# Patient Record
Sex: FEMALE | Race: WHITE | Employment: OTHER | URBAN - METROPOLITAN AREA
[De-identification: names, ages, dates, MRNs, and addresses within clinical notes are randomized per-mention and may not be internally consistent; named-entity substitution may affect disease eponyms.]

---

## 2017-01-01 ENCOUNTER — TELEPHONE (OUTPATIENT)
Dept: FAMILY MEDICINE CLINIC | Age: 82
End: 2017-01-01

## 2017-01-01 ENCOUNTER — PATIENT OUTREACH (OUTPATIENT)
Dept: FAMILY MEDICINE CLINIC | Age: 82
End: 2017-01-01

## 2017-01-01 ENCOUNTER — HOSPITAL ENCOUNTER (OUTPATIENT)
Dept: LAB | Age: 82
Discharge: HOME | End: 2017-06-09
Payer: MEDICARE

## 2017-01-01 ENCOUNTER — OFFICE VISIT (OUTPATIENT)
Dept: FAMILY MEDICINE CLINIC | Age: 82
End: 2017-01-01

## 2017-01-01 VITALS
RESPIRATION RATE: 18 BRPM | HEART RATE: 78 BPM | WEIGHT: 142.8 LBS | OXYGEN SATURATION: 95 % | HEIGHT: 64 IN | DIASTOLIC BLOOD PRESSURE: 72 MMHG | TEMPERATURE: 97.6 F | BODY MASS INDEX: 24.38 KG/M2 | SYSTOLIC BLOOD PRESSURE: 130 MMHG

## 2017-01-01 VITALS
BODY MASS INDEX: 23.76 KG/M2 | HEART RATE: 63 BPM | WEIGHT: 139.2 LBS | RESPIRATION RATE: 18 BRPM | OXYGEN SATURATION: 98 % | HEIGHT: 64 IN | TEMPERATURE: 98.1 F | SYSTOLIC BLOOD PRESSURE: 124 MMHG | DIASTOLIC BLOOD PRESSURE: 68 MMHG

## 2017-01-01 DIAGNOSIS — I77.9 BILATERAL CAROTID ARTERY DISEASE (HCC): ICD-10-CM

## 2017-01-01 DIAGNOSIS — Z87.39 HISTORY OF GOUT: Chronic | ICD-10-CM

## 2017-01-01 DIAGNOSIS — L97.509 UNCONTROLLED TYPE 2 DIABETES MELLITUS WITH FOOT ULCER, UNSPECIFIED LONG TERM INSULIN USE STATUS: Primary | ICD-10-CM

## 2017-01-01 DIAGNOSIS — L97.509 UNCONTROLLED TYPE 2 DIABETES MELLITUS WITH FOOT ULCER, UNSPECIFIED LONG TERM INSULIN USE STATUS: ICD-10-CM

## 2017-01-01 DIAGNOSIS — E11.65 UNCONTROLLED TYPE 2 DIABETES MELLITUS WITH FOOT ULCER, UNSPECIFIED LONG TERM INSULIN USE STATUS: Primary | ICD-10-CM

## 2017-01-01 DIAGNOSIS — M47.22 OSTEOARTHRITIS OF SPINE WITH RADICULOPATHY, CERVICAL REGION: Chronic | ICD-10-CM

## 2017-01-01 DIAGNOSIS — E78.5 HYPERLIPIDEMIA, UNSPECIFIED HYPERLIPIDEMIA TYPE: ICD-10-CM

## 2017-01-01 DIAGNOSIS — Z00.00 MEDICARE ANNUAL WELLNESS VISIT, SUBSEQUENT: ICD-10-CM

## 2017-01-01 DIAGNOSIS — I50.22 CHRONIC SYSTOLIC CONGESTIVE HEART FAILURE (HCC): ICD-10-CM

## 2017-01-01 DIAGNOSIS — I10 ESSENTIAL HYPERTENSION, BENIGN: ICD-10-CM

## 2017-01-01 DIAGNOSIS — E55.9 VITAMIN D DEFICIENCY: ICD-10-CM

## 2017-01-01 DIAGNOSIS — E11.618: ICD-10-CM

## 2017-01-01 DIAGNOSIS — E78.00 PURE HYPERCHOLESTEROLEMIA: ICD-10-CM

## 2017-01-01 DIAGNOSIS — Z71.89 ACP (ADVANCE CARE PLANNING): ICD-10-CM

## 2017-01-01 DIAGNOSIS — N18.30 CHRONIC RENAL FAILURE, STAGE 3 (MODERATE) (HCC): ICD-10-CM

## 2017-01-01 DIAGNOSIS — M85.80 OSTEOPENIA: ICD-10-CM

## 2017-01-01 DIAGNOSIS — R20.2 NUMBNESS AND TINGLING IN BOTH HANDS: ICD-10-CM

## 2017-01-01 DIAGNOSIS — I25.10 CORONARY ARTERY DISEASE INVOLVING NATIVE CORONARY ARTERY OF NATIVE HEART WITHOUT ANGINA PECTORIS: ICD-10-CM

## 2017-01-01 DIAGNOSIS — R20.0 NUMBNESS AND TINGLING IN BOTH HANDS: ICD-10-CM

## 2017-01-01 DIAGNOSIS — M89.9 DISORDER OF BONE: ICD-10-CM

## 2017-01-01 DIAGNOSIS — E78.5 HYPERLIPIDEMIA, UNSPECIFIED HYPERLIPIDEMIA TYPE: Primary | ICD-10-CM

## 2017-01-01 DIAGNOSIS — E11.621 UNCONTROLLED TYPE 2 DIABETES MELLITUS WITH FOOT ULCER, UNSPECIFIED LONG TERM INSULIN USE STATUS: ICD-10-CM

## 2017-01-01 DIAGNOSIS — E11.65 UNCONTROLLED TYPE 2 DIABETES MELLITUS WITH FOOT ULCER, UNSPECIFIED LONG TERM INSULIN USE STATUS: ICD-10-CM

## 2017-01-01 DIAGNOSIS — M10.00 IDIOPATHIC GOUT, UNSPECIFIED CHRONICITY, UNSPECIFIED SITE: ICD-10-CM

## 2017-01-01 DIAGNOSIS — E11.621 UNCONTROLLED TYPE 2 DIABETES MELLITUS WITH FOOT ULCER, UNSPECIFIED LONG TERM INSULIN USE STATUS: Primary | ICD-10-CM

## 2017-01-01 LAB
25(OH)D3+25(OH)D2 SERPL-MCNC: 14.4 NG/ML (ref 30–100)
25(OH)D3+25(OH)D2 SERPL-MCNC: 22.1 NG/ML (ref 30–100)
ALBUMIN SERPL-MCNC: 3.9 G/DL (ref 3.5–4.7)
ALBUMIN SERPL-MCNC: 3.9 G/DL (ref 3.5–4.8)
ALBUMIN/GLOB SERPL: 1.3 {RATIO} (ref 1.1–2.5)
ALBUMIN/GLOB SERPL: 1.3 {RATIO} (ref 1.2–2.2)
ALP SERPL-CCNC: 97 IU/L (ref 39–117)
ALP SERPL-CCNC: 98 IU/L (ref 39–117)
ALT SERPL-CCNC: 21 IU/L (ref 0–32)
ALT SERPL-CCNC: 32 IU/L (ref 0–32)
AST SERPL-CCNC: 24 IU/L (ref 0–40)
AST SERPL-CCNC: 37 IU/L (ref 0–40)
BILIRUB SERPL-MCNC: 0.3 MG/DL (ref 0–1.2)
BILIRUB SERPL-MCNC: 0.4 MG/DL (ref 0–1.2)
BUN SERPL-MCNC: 38 MG/DL (ref 8–27)
BUN SERPL-MCNC: 47 MG/DL (ref 8–27)
BUN/CREAT SERPL: 32 (ref 11–26)
BUN/CREAT SERPL: 34 (ref 12–28)
CALCIUM SERPL-MCNC: 9.5 MG/DL (ref 8.7–10.3)
CALCIUM SERPL-MCNC: 9.5 MG/DL (ref 8.7–10.3)
CHLORIDE SERPL-SCNC: 101 MMOL/L (ref 96–106)
CHLORIDE SERPL-SCNC: 101 MMOL/L (ref 96–106)
CHOLEST SERPL-MCNC: 152 MG/DL (ref 100–199)
CHOLEST SERPL-MCNC: 157 MG/DL (ref 100–199)
CO2 SERPL-SCNC: 21 MMOL/L (ref 18–29)
CO2 SERPL-SCNC: 22 MMOL/L (ref 18–29)
CREAT SERPL-MCNC: 1.18 MG/DL (ref 0.57–1)
CREAT SERPL-MCNC: 1.39 MG/DL (ref 0.57–1)
EST. AVERAGE GLUCOSE BLD GHB EST-MCNC: 123 MG/DL
GLOBULIN SER CALC-MCNC: 3 G/DL (ref 1.5–4.5)
GLOBULIN SER CALC-MCNC: 3 G/DL (ref 1.5–4.5)
GLUCOSE SERPL-MCNC: 133 MG/DL (ref 65–99)
GLUCOSE SERPL-MCNC: 143 MG/DL (ref 65–99)
HBA1C MFR BLD: 5.9 % (ref 4.8–5.6)
HDLC SERPL-MCNC: 30 MG/DL
HDLC SERPL-MCNC: 31 MG/DL
INTERPRETATION, 910389: NORMAL
INTERPRETATION, 910389: NORMAL
INTERPRETATION: NORMAL
INTERPRETATION: NORMAL
LDLC SERPL CALC-MCNC: 81 MG/DL (ref 0–99)
LDLC SERPL CALC-MCNC: 85 MG/DL (ref 0–99)
PDF IMAGE, 910387: NORMAL
PDF IMAGE, 910387: NORMAL
POTASSIUM SERPL-SCNC: 5.3 MMOL/L (ref 3.5–5.2)
POTASSIUM SERPL-SCNC: 5.4 MMOL/L (ref 3.5–5.2)
PROT SERPL-MCNC: 6.9 G/DL (ref 6–8.5)
PROT SERPL-MCNC: 6.9 G/DL (ref 6–8.5)
SODIUM SERPL-SCNC: 139 MMOL/L (ref 134–144)
SODIUM SERPL-SCNC: 142 MMOL/L (ref 134–144)
TRIGL SERPL-MCNC: 185 MG/DL (ref 0–149)
TRIGL SERPL-MCNC: 227 MG/DL (ref 0–149)
VIT B12 SERPL-MCNC: 805 PG/ML (ref 211–946)
VLDLC SERPL CALC-MCNC: 37 MG/DL (ref 5–40)
VLDLC SERPL CALC-MCNC: 45 MG/DL (ref 5–40)

## 2017-01-01 PROCEDURE — 36415 COLL VENOUS BLD VENIPUNCTURE: CPT

## 2017-01-01 PROCEDURE — 80061 LIPID PANEL: CPT

## 2017-01-01 PROCEDURE — 80053 COMPREHEN METABOLIC PANEL: CPT

## 2017-01-01 PROCEDURE — 82306 VITAMIN D 25 HYDROXY: CPT

## 2017-01-01 PROCEDURE — 82607 VITAMIN B-12: CPT

## 2017-01-01 RX ORDER — INSULIN PUMP SYRINGE, 3 ML
EACH MISCELLANEOUS
Qty: 1 KIT | Refills: 0 | Status: SHIPPED | OUTPATIENT
Start: 2017-01-01

## 2017-01-01 RX ORDER — FOLIC ACID 1 MG/1
1 TABLET ORAL DAILY
Qty: 90 TAB | Refills: 3 | Status: SHIPPED | OUTPATIENT
Start: 2017-01-01

## 2017-01-01 RX ORDER — LOSARTAN POTASSIUM 100 MG/1
TABLET ORAL
Qty: 90 TAB | Refills: 1 | Status: SHIPPED | OUTPATIENT
Start: 2017-01-01

## 2017-01-01 RX ORDER — OXYCODONE AND ACETAMINOPHEN 5; 325 MG/1; MG/1
1 TABLET ORAL
Qty: 60 TAB | Refills: 0 | Status: SHIPPED | OUTPATIENT
Start: 2017-01-01

## 2017-01-01 RX ORDER — ISOSORBIDE MONONITRATE 60 MG/1
TABLET, EXTENDED RELEASE ORAL
Qty: 90 TAB | Refills: 1 | Status: SHIPPED | OUTPATIENT
Start: 2017-01-01 | End: 2017-01-01 | Stop reason: SDUPTHER

## 2017-01-01 RX ORDER — COLCHICINE 0.6 MG/1
TABLET ORAL
Qty: 90 TAB | Refills: 3 | Status: SHIPPED | OUTPATIENT
Start: 2017-01-01

## 2017-01-01 RX ORDER — ISOSORBIDE MONONITRATE 60 MG/1
TABLET, EXTENDED RELEASE ORAL
Qty: 90 TAB | Refills: 1 | Status: SHIPPED | OUTPATIENT
Start: 2017-01-01

## 2017-01-01 RX ORDER — FUROSEMIDE 20 MG/1
20 TABLET ORAL DAILY
Qty: 90 TAB | Refills: 1 | Status: SHIPPED | OUTPATIENT
Start: 2017-01-01

## 2017-01-01 RX ORDER — METOPROLOL SUCCINATE 50 MG/1
TABLET, EXTENDED RELEASE ORAL
Qty: 90 TAB | Refills: 1 | Status: SHIPPED | OUTPATIENT
Start: 2017-01-01 | End: 2017-01-01 | Stop reason: SDUPTHER

## 2017-01-01 RX ORDER — OXYCODONE AND ACETAMINOPHEN 5; 325 MG/1; MG/1
1 TABLET ORAL
Qty: 60 TAB | Refills: 0 | Status: SHIPPED | OUTPATIENT
Start: 2017-01-01 | End: 2017-01-01 | Stop reason: SDUPTHER

## 2017-01-01 RX ORDER — GABAPENTIN 300 MG/1
300 CAPSULE ORAL DAILY
Qty: 90 CAP | Refills: 3 | Status: SHIPPED | OUTPATIENT
Start: 2017-01-01 | End: 2017-01-01 | Stop reason: SDUPTHER

## 2017-01-01 RX ORDER — AZITHROMYCIN 250 MG/1
250 TABLET, FILM COATED ORAL SEE ADMIN INSTRUCTIONS
COMMUNITY
Start: 2017-01-01 | End: 2017-01-01

## 2017-01-01 RX ORDER — FUROSEMIDE 40 MG/1
40 TABLET ORAL DAILY
Qty: 90 TAB | Refills: 1 | Status: SHIPPED | OUTPATIENT
Start: 2017-01-01 | End: 2017-01-01 | Stop reason: SDUPTHER

## 2017-01-01 RX ORDER — ACETAMINOPHEN AND CODEINE PHOSPHATE 300; 30 MG/1; MG/1
1 TABLET ORAL
COMMUNITY

## 2017-01-01 RX ORDER — OXYCODONE AND ACETAMINOPHEN 5; 325 MG/1; MG/1
1 TABLET ORAL 2 TIMES DAILY
Qty: 60 TAB | Refills: 0 | Status: SHIPPED | OUTPATIENT
Start: 2017-01-01 | End: 2017-01-01 | Stop reason: SDUPTHER

## 2017-01-01 RX ORDER — GABAPENTIN 300 MG/1
300 CAPSULE ORAL DAILY
Qty: 90 CAP | Refills: 3 | Status: SHIPPED | OUTPATIENT
Start: 2017-01-01

## 2017-01-01 RX ORDER — AMLODIPINE BESYLATE 10 MG/1
10 TABLET ORAL DAILY
Qty: 90 TAB | Refills: 1 | Status: SHIPPED | OUTPATIENT
Start: 2017-01-01

## 2017-01-01 RX ORDER — LANCETS
EACH MISCELLANEOUS
Qty: 3 PACKAGE | Refills: 3 | Status: SHIPPED | OUTPATIENT
Start: 2017-01-01

## 2017-01-01 RX ORDER — LOSARTAN POTASSIUM 100 MG/1
TABLET ORAL
Qty: 90 TAB | Refills: 1 | Status: SHIPPED | OUTPATIENT
Start: 2017-01-01 | End: 2017-01-01 | Stop reason: SDUPTHER

## 2017-01-01 RX ORDER — METOPROLOL SUCCINATE 50 MG/1
TABLET, EXTENDED RELEASE ORAL
Qty: 90 TAB | Refills: 1 | Status: SHIPPED | OUTPATIENT
Start: 2017-01-01

## 2017-01-01 RX ORDER — ATORVASTATIN CALCIUM 20 MG/1
TABLET, FILM COATED ORAL
Qty: 90 TAB | Refills: 1 | Status: SHIPPED | OUTPATIENT
Start: 2017-01-01

## 2017-01-01 RX ORDER — FUROSEMIDE 20 MG/1
20 TABLET ORAL DAILY
Qty: 90 TAB | Refills: 1 | Status: SHIPPED | OUTPATIENT
Start: 2017-01-01 | End: 2017-01-01 | Stop reason: SDUPTHER

## 2017-01-01 RX ORDER — ALBUTEROL SULFATE 90 UG/1
1 AEROSOL, METERED RESPIRATORY (INHALATION)
COMMUNITY

## 2017-02-06 NOTE — TELEPHONE ENCOUNTER
----- Message from Randa Yuen sent at 2/6/2017  2:28 PM EST -----  Regarding: Dr Griffiths Leader refill  Pt (p)  [phone], requesting rx refill for her pain medication percocet, pt will  the rx when ready.

## 2017-02-06 NOTE — LETTER
2/6/2017 2:39 PM 
 
Ms. Shira Ramos 
[address] [facility] [phone] Effective January 1, 6984 a new policy will be implemented by [address] [place] [address] for patients who are receiving controlled pain medications (i.e. hydrocodone, oxycodone, hydromorphone, etc). While these medications are intended to help individuals with their chronic pain symptoms, they have the potential for serious side effects. These side effects may include, but are not limited to respiratory distress, low blood pressure, confusion, dependency and abuse. Since these medications are regulated by the Drug Enforcement Agency [facility]) and given a rise in abuse of these medications across the nation, we are obligated to monitor the use of these medications more closely. All patients receiving chronic opiate pain medications will be required to sign a controlled substance agreement. This is an agreement between patient and provider to ensure compliance with treatment. It also includes urine drug screens, maximum dosing limits for medications and follow-up appointments every three months for prescription refills. Your prescriptions will only be filled at a office visit We understand these changes may be inconvenient or frustrating, however, they align with current clinical guidelines and are intended to improve patient care and safety. Please call now to schedule an appointment with me for your next refill. Please address any questions or concerns with your provider at your next visit. Thank you for entrusting your healthcare in our hands. [address] [place] [address] Providers Sincerely, 
 
 
Ramiro Cuevas MD

## 2017-03-10 PROBLEM — M47.22 OSTEOARTHRITIS OF SPINE WITH RADICULOPATHY, CERVICAL REGION: Chronic | Status: ACTIVE | Noted: 2017-01-01

## 2017-03-10 NOTE — MR AVS SNAPSHOT
Visit Information Date & Time Provider Department Dept. Phone Encounter #  
 3/10/2017  8:30 AM Marleni Campa  [address] [place] [phone] [id number] Follow-up Instructions Return in about 6 months (around 9/10/2017) for F/U HTN,CHOL,DM. Your Appointments 6/9/2017  8:45 AM  
ROUTINE CARE with Marleni Campa MD  
[facility]) Appt Note: 3 month med check [address] [phone]  
[phone]  
  
   
 [address] [phone] Upcoming Health Maintenance Date Due OSTEOPOROSIS SCREENING (DEXA) 3/27/2002 GLAUCOMA SCREENING Q2Y 3/12/2017 EYE EXAM RETINAL OR DILATED Q1 4/10/2017* FOOT EXAM Q1 4/19/2017 HEMOGLOBIN A1C Q6M 5/29/2017 MEDICARE YEARLY EXAM 6/28/2017 MICROALBUMIN Q1 11/29/2017 LIPID PANEL Q1 11/29/2017 DTaP/Tdap/Td series (2 - Td) 6/27/2026 *Topic was postponed. The date shown is not the original due date. Allergies as of 3/10/2017  Review Complete On: 3/10/2017 By: Marleni Campa MD  
  
 Severity Noted Reaction Type Reactions Glucophage [Metformin]  10/13/2010    Anaphylaxis Current Immunizations  Reviewed on 6/16/2013 Name Date Influenza High Dose Vaccine PF 10/26/2016, 10/25/2015 Influenza Vaccine 10/27/2016, 11/8/2014, 10/1/2013 Influenza Vaccine Split 10/16/2012 Pneumococcal Polysaccharide (PPSV-23) 8/1/2012 Pneumococcal Vaccine (Unspecified Type) 10/25/2015 Not reviewed this visit You Were Diagnosed With   
  
 Codes Comments Uncontrolled type 2 diabetes mellitus with foot ulcer, unspecified long term insulin use status ([facility]    -  Primary ICD-10-CM: E11.621, L97.509, E11.65 ICD-9-CM: 250.82, 707.15 Hyperlipidemia, unspecified hyperlipidemia type     ICD-10-CM: E78.5 ICD-9-CM: 272.4 Essential hypertension, benign     ICD-10-CM: I10 
ICD-9-CM: 401.1 Osteopenia     ICD-10-CM: M85.80 ICD-9-CM: 733.90 Coronary artery disease involving native coronary artery of native heart without angina pectoris     ICD-10-CM: I25.10 ICD-9-CM: 414.01 Osteoarthritis of spine with radiculopathy, cervical region     ICD-10-CM: M47.22 
ICD-9-CM: 721.0 Vitamin D deficiency     ICD-10-CM: E55.9 ICD-9-CM: 268.9 Disorder of bone     ICD-10-CM: M89.9 ICD-9-CM: 733.90 Chronic systolic congestive heart failure (HCC)     ICD-10-CM: I50.22 ICD-9-CM: 428.22, 428.0 Vitals BP Pulse Temp Resp Height(growth percentile) Weight(growth percentile) 124/68 (BP 1 Location: Left arm, BP Patient Position: Sitting) 63 98.1 °F (36.7 °C) (Oral) 18 5' 4\" (1.626 m) 139 lb 3.2 oz (63.1 kg) SpO2 BMI OB Status Smoking Status 98% 23.89 kg/m2 Postmenopausal Never Smoker BMI and BSA Data Body Mass Index Body Surface Area  
 23.89 kg/m 2 1.69 m 2 Preferred Pharmacy Pharmacy Name Phone [facility] [address] [place] [address], [address] [place] [address] [phone] Your Updated Medication List  
  
   
This list is accurate as of: 3/10/17  9:42 AM.  Always use your most recent med list. amLODIPine 10 mg tablet Commonly known as:  Guzman Mullet Take 1 Tab by mouth daily. aspirin 81 mg tablet Take 81 mg by mouth. atorvastatin 20 mg tablet Commonly known as:  LIPITOR  
TAKE 1 TABLET BY MOUTH AT BEDTIME  
  
 colchicine 0.6 mg tablet Commonly known as:  COLCRYS  
TAKE 1 TABLET BY MOUTH EVERY DAY  
  
 folic acid 1 mg tablet Commonly known as:  Google Take 1 Tab by mouth daily. furosemide 20 mg tablet Commonly known as:  LASIX Take 1 Tab by mouth daily. gabapentin 300 mg capsule Commonly known as:  NEURONTIN Take 1 Cap by mouth daily. glucose blood VI test strips strip Commonly known as:  FREESTYLE TEST  
 Freedom freestyle test strips test tid 250.00 Insulin Needles (Disposable) 31 gauge x 5/16\" Ndle Commonly known as:  BD INSULIN PEN NEEDLE UF SHORT To use with lantus daily 250.00 Insulin Syringes (Disposable) 1 mL Syrg  
20 Units by Does Not Apply route daily. isosorbide mononitrate ER 60 mg CR tablet Commonly known as:  IMDUR  
TAKE ONE TABLET BY MOUTH EVERY DAY Lancets Misc Test tid  
  
 losartan 100 mg tablet Commonly known as:  COZAAR  
TAKE 1 TAB BY MOUTH DAILY. metoprolol succinate 50 mg XL tablet Commonly known as:  TOPROL-XL  
TAKE 1 TAB BY MOUTH DAILY. * oxyCODONE-acetaminophen 5-325 mg per tablet Commonly known as:  PERCOCET Take 1 Tab by mouth two (2) times daily as needed. * oxyCODONE-acetaminophen 5-325 mg per tablet Commonly known as:  PERCOCET Take 1 Tab by mouth two (2) times a day. Max Daily Amount: 2 Tabs. Start taking on:  4/10/2017 * oxyCODONE-acetaminophen 5-325 mg per tablet Commonly known as:  PERCOCET Take 1 Tab by mouth two (2) times a day. Max Daily Amount: 2 Tabs. Start taking on:  5/10/2017 sAXagliptin 5 mg Tab tablet Commonly known as:  ONGLYZA TAKE 1 TABLET BY MOUTH EVERY DAY  
  
 * Notice: This list has 3 medication(s) that are the same as other medications prescribed for you. Read the directions carefully, and ask your doctor or other care provider to review them with you. Prescriptions Printed Refills  
 oxyCODONE-acetaminophen (PERCOCET) 5-325 mg per tablet 0 Sig: Take 1 Tab by mouth two (2) times daily as needed. Class: Print Route: Oral  
 oxyCODONE-acetaminophen (PERCOCET) 5-325 mg per tablet 0 Starting on: 4/10/2017 Sig: Take 1 Tab by mouth two (2) times a day. Max Daily Amount: 2 Tabs. Class: Print Route: Oral  
 oxyCODONE-acetaminophen (PERCOCET) 5-325 mg per tablet 0 Starting on: 5/10/2017 Sig: Take 1 Tab by mouth two (2) times a day. Max Daily Amount: 2 Tabs. Class: Print Route: Oral  
  
We Performed the Following HEMOGLOBIN A1C WITH EAG [27689 CPT(R)] LIPID PANEL [00055 CPT(R)] METABOLIC PANEL, COMPREHENSIVE [22323 CPT(R)] MA COLLECTION VENOUS BLOOD,VENIPUNCTURE [id number] CPT(R)] MA HANDLG&/OR CONVEY OF SPEC FOR TR OFFICE TO LAB [09933 CPT(R)] VITAMIN D, 25 HYDROXY [id number] CPT(R)] Follow-up Instructions Return in about 6 months (around 9/10/2017) for F/U HTN,CHOL,DM. To-Do List   
 03/10/2017 Imaging:  DEXA BONE DENSITY STUDY AXIAL Patient Instructions High Blood Pressure: Care Instructions Your Care Instructions If your blood pressure is usually above 140/90, you have high blood pressure, or hypertension. That means the top number is 140 or higher or the bottom number is 90 or higher, or both. Despite what a lot of people think, high blood pressure usually doesn't cause headaches or make you feel dizzy or lightheaded. It usually has no symptoms. But it does increase your risk for heart attack, stroke, and kidney or eye damage. The higher your blood pressure, the more your risk increases. Your doctor will give you a goal for your blood pressure. Your goal will be based on your health and your age. An example of a goal is to keep your blood pressure below 140/90. Lifestyle changes, such as eating healthy and being active, are always important to help lower blood pressure. You might also take medicine to reach your blood pressure goal. 
Follow-up care is a key part of your treatment and safety. Be sure to make and go to all appointments, and call your doctor if you are having problems. It's also a good idea to know your test results and keep a list of the medicines you take. How can you care for yourself at home? Medical treatment · If you stop taking your medicine, your blood pressure will go back up. You may take one or more types of medicine to lower your blood pressure. Be safe with medicines. Take your medicine exactly as prescribed. Call your doctor if you think you are having a problem with your medicine. · Talk to your doctor before you start taking aspirin every day. Aspirin can help certain people lower their risk of a heart attack or stroke. But taking aspirin isn't right for everyone, because it can cause serious bleeding. · See your doctor regularly. You may need to see the doctor more often at first or until your blood pressure comes down. · If you are taking blood pressure medicine, talk to your doctor before you take decongestants or anti-inflammatory medicine, such as ibuprofen. Some of these medicines can raise blood pressure. · Learn how to check your blood pressure at home. Lifestyle changes · Stay at a healthy weight. This is especially important if you put on weight around the waist. Losing even 10 pounds can help you lower your blood pressure. · If your doctor recommends it, get more exercise. Walking is a good choice. Bit by bit, increase the amount you walk every day. Try for at least 30 minutes on most days of the week. You also may want to swim, bike, or do other activities. · Avoid or limit alcohol. Talk to your doctor about whether you can drink any alcohol. · Try to limit how much sodium you eat to less than 2,300 milligrams (mg) a day. Your doctor may ask you to try to eat less than 1,500 mg a day. · Eat plenty of fruits (such as bananas and oranges), vegetables, legumes, whole grains, and low-fat dairy products. · Lower the amount of saturated fat in your diet. Saturated fat is found in animal products such as milk, cheese, and meat. Limiting these foods may help you lose weight and also lower your risk for heart disease. · Do not smoke. Smoking increases your risk for heart attack and stroke.  If you need help quitting, talk to your doctor about stop-smoking programs and medicines. These can increase your chances of quitting for good. When should you call for help? Call 911 anytime you think you may need emergency care. This may mean having symptoms that suggest that your blood pressure is causing a serious heart or blood vessel problem. Your blood pressure may be over 180/110. For example, call 911 if: 
· You have symptoms of a heart attack. These may include: ¨ Chest pain or pressure, or a strange feeling in the chest. 
¨ Sweating. ¨ Shortness of breath. ¨ Nausea or vomiting. ¨ Pain, pressure, or a strange feeling in the back, neck, jaw, or upper belly or in one or both shoulders or arms. ¨ Lightheadedness or sudden weakness. ¨ A fast or irregular heartbeat. · You have symptoms of a stroke. These may include: 
¨ Sudden numbness, tingling, weakness, or loss of movement in your face, arm, or leg, especially on only one side of your body. ¨ Sudden vision changes. ¨ Sudden trouble speaking. ¨ Sudden confusion or trouble understanding simple statements. ¨ Sudden problems with walking or balance. ¨ A sudden, severe headache that is different from past headaches. · You have severe back or belly pain. Do not wait until your blood pressure comes down on its own. Get help right away. Call your doctor now or seek immediate care if: 
· Your blood pressure is much higher than normal (such as 180/110 or higher), but you don't have symptoms. · You think high blood pressure is causing symptoms, such as: ¨ Severe headache. ¨ Blurry vision. Watch closely for changes in your health, and be sure to contact your doctor if: 
· Your blood pressure measures 140/90 or higher at least 2 times. That means the top number is 140 or higher or the bottom number is 90 or higher, or both. · You think you may be having side effects from your blood pressure medicine. · Your blood pressure is usually normal, but it goes above normal at least 2 times. Where can you learn more? Go to http://aurea-minh.info/. Enter W597 in the search box to learn more about \"High Blood Pressure: Care Instructions. \" Current as of: August 8, 2016 Content Version: 11.1 © 6811-6936 [employer]. Care instructions adapted under license by [employer] (which disclaims liability or warranty for this information). If you have questions about a medical condition or this instruction, always ask your healthcare professional. [place] [address] any warranty or liability for your use of this information. Please provide this summary of care documentation to your next provider. Your primary care clinician is listed as [place] [address], [facility]/s . If you have any questions after today's visit, please call [phone].

## 2017-03-10 NOTE — TELEPHONE ENCOUNTER
Compa Bullock  [phone]    Ms. Christiansenpard Collet states that she was just here to see Dr. Caty Massey and he asked her if she needs a free style blood machine. She wants to let him know that she does. Please advise.

## 2017-03-10 NOTE — PROGRESS NOTES
Chief Complaint   Patient presents with    Diabetes    Hypertension    Cholesterol Problem     fasting today     1. Have you been to the ER, urgent care clinic since your last visit? Hospitalized since your last visit? No    2. Have you seen or consulted any other health care providers outside of the [address] [place] [address] since your last visit? Include any pap smears or colon screening.  No

## 2017-03-10 NOTE — TELEPHONE ENCOUNTER
Insurance covers one touch ultra machine and strips   Test bid  Freestyle discontinued per verbal order Dr Lorri Bolanos  Rx signed and sent to pharmacy per verbal order Dr Lorri Bolanos

## 2017-03-10 NOTE — PROGRESS NOTES
HISTORY OF PRESENT ILLNESS  HPI  Romina Parry is a 80 y.o. Female with history of CAD, CHF, HTN, DM-II, hyperlipidemia, gout, and vitamin D deficiency who presents to office today for fasting follow-up. Pt notes that she takes percocet BID for mid- and lower back pain and burning foot pain; she states that her foot pain is worse than the back pain. Pt states that she was hospitalized three months ago for SOB and fluid around her heart at [facility] for a week, then transferred to [facility] for six weeks. She was prescribed gabapentin, which she has been taking once a day for the past two weeks; she notes that it has not relieved her foot pain. Pt denies SOB since her hospitalization. Pt states that she was taken off of insulin 2-3 years ago. Pt's last vitamin D level was checked 1.5 years ago; while it was low, she has not been taking vitamin D.  Pt states that she saw a nephrologist last year.           Past Medical History:   Diagnosis Date    Arthritis     CAD (coronary artery disease)     Chronic renal failure, stage 3 (moderate)-Dr. Mac Pizarro since 11/2015 4/25/2016    Congestive heart failure, unspecified     Diabetes ([address]     Heart failure ([address]     Hypercholesterolemia     Hypertension     MI (myocardial infarction) ([address] 7/2013    Osteoarthritis of spine with radiculopathy, cervical region 3/10/2017    Osteopenia     DEXA 2007    Sleep apnea     Stroke [facility]) Feb 26-March 6    TIA's while at [facility]=F    Vitamin D deficiency 4/25/2016     Past Surgical History:   Procedure Laterality Date    CARDIAC CATHETERIZATION      2006 EF 50%    CARDIAC SURG PROCEDURE UNLIST  2005    cabg    HX APPENDECTOMY      HX OTHER SURGICAL  7/31/2013    stent placment in patients heart and kidney    VASCULAR SURGERY PROCEDURE UNLIST      carotid enarterectomy, bilateral     Current Outpatient Prescriptions on File Prior to Visit   Medication Sig Dispense Refill    amLODIPine (NORVASC) 10 mg tablet Take 1 Tab by mouth daily. 90 Tab 1    gabapentin (NEURONTIN) 300 mg capsule Take 1 Cap by mouth daily. 90 Cap 3    folic acid (FOLVITE) 1 mg tablet Take 1 Tab by mouth daily. 90 Tab 3    metoprolol succinate (TOPROL-XL) 50 mg XL tablet TAKE 1 TAB BY MOUTH DAILY. 90 Tab 1    sAXagliptin (ONGLYZA) 5 mg tab tablet TAKE 1 TABLET BY MOUTH EVERY DAY 90 Tab 1    colchicine (COLCRYS) 0.6 mg tablet TAKE 1 TABLET BY MOUTH EVERY DAY 90 Tab 3    isosorbide mononitrate ER (IMDUR) 60 mg CR tablet TAKE ONE TABLET BY MOUTH EVERY DAY 90 Tab 1    furosemide (LASIX) 20 mg tablet Take 1 Tab by mouth daily. 90 Tab 1    losartan (COZAAR) 100 mg tablet TAKE 1 TAB BY MOUTH DAILY. 90 Tab 1    atorvastatin (LIPITOR) 20 mg tablet TAKE 1 TABLET BY MOUTH AT BEDTIME 90 Tab 1    Insulin Needles, Disposable, (BD INSULIN PEN NEEDLE UF SHORT) 31 X 5/16 \" ndle To use with lantus daily 250.00 3 Package 3    Insulin Syringes, Disposable, 1 mL syrg 20 Units by Does Not Apply route daily. 90 Syringe 3    aspirin 81 mg tablet Take 81 mg by mouth. No current facility-administered medications on file prior to visit.       Allergies   Allergen Reactions    Glucophage [Metformin] Anaphylaxis     Family History   Problem Relation Age of Onset    Diabetes Mother     Hypertension Mother     Heart Disease Maternal Grandfather     Cancer Other 62     breast cancer    Heart Disease Sister      Social History     Social History    Marital status:      Spouse name: N/A    Number of children: N/A    Years of education: N/A     Social History Main Topics    Smoking status: Never Smoker    Smokeless tobacco: Never Used    Alcohol use No    Drug use: No    Sexual activity: No     Other Topics Concern     Service No    Blood Transfusions No     unknown    Caffeine Concern Yes     2 cups of tea daily    Occupational Exposure No    Hobby Hazards No    Sleep Concern No    Stress Concern No    Weight Concern No    Special Diet Yes no added salt, diabetic diet    Back Care No    Exercise Yes    Bike Helmet No    Seat Belt Yes    Self-Exams No     Social History Narrative             Review of Systems   Constitutional: Negative for chills, diaphoresis, fever, malaise/fatigue and weight loss. Eyes: Negative for blurred vision, double vision and pain. Respiratory: Negative for cough, shortness of breath and wheezing. Cardiovascular: Negative for chest pain, palpitations, orthopnea, claudication, leg swelling and PND. Genitourinary: Negative for frequency. Musculoskeletal: Positive for back pain (bilateral mid & lower). Skin: Negative for itching and rash. Neurological: Negative for dizziness, tingling, tremors, sensory change, speech change, focal weakness, seizures, loss of consciousness, weakness and headaches. Bilateral burning foot pain   Endo/Heme/Allergies: Negative for environmental allergies and polydipsia. Does not bruise/bleed easily. Results for orders placed or performed in visit on 03/10/17   LIPID PANEL   Result Value Ref Range    Cholesterol, total 152 100 - 199 mg/dL    Triglyceride 185 (H) 0 - 149 mg/dL    HDL Cholesterol 30 (L) >39 mg/dL    VLDL, calculated 37 5 - 40 mg/dL    LDL, calculated 85 0 - 99 mg/dL   METABOLIC PANEL, COMPREHENSIVE   Result Value Ref Range    Glucose 133 (H) 65 - 99 mg/dL    BUN 38 (H) 8 - 27 mg/dL    Creatinine 1.18 (H) 0.57 - 1.00 mg/dL    GFR est non-AA 44 (L) >59 mL/min/1.73    GFR est AA 51 (L) >59 mL/min/1.73    BUN/Creatinine ratio 32 (H) 11 - 26    Sodium 142 134 - 144 mmol/L    Potassium 5.4 (H) 3.5 - 5.2 mmol/L    Chloride 101 96 - 106 mmol/L    CO2 22 18 - 29 mmol/L    Calcium 9.5 8.7 - 10.3 mg/dL    Protein, total 6.9 6.0 - 8.5 g/dL    Albumin 3.9 3.5 - 4.8 g/dL    GLOBULIN, TOTAL 3.0 1.5 - 4.5 g/dL    A-G Ratio 1.3 1.1 - 2.5    Bilirubin, total 0.3 0.0 - 1.2 mg/dL    Alk.  phosphatase 97 39 - 117 IU/L    AST (SGOT) 24 0 - 40 IU/L    ALT (SGPT) 21 0 - 32 IU/L HEMOGLOBIN A1C WITH EAG   Result Value Ref Range    Hemoglobin A1c 5.9 (H) 4.8 - 5.6 %    Estimated average glucose 123 mg/dL   VITAMIN D, 25 HYDROXY   Result Value Ref Range    VITAMIN D, 25-HYDROXY 14.4 (L) 30.0 - 100.0 ng/mL   CVD REPORT   Result Value Ref Range    INTERPRETATION NTAP     PDF IMAGE Not applicable    CKD REPORT   Result Value Ref Range    Interpretation Note              Physical Exam  Visit Vitals    /68 (BP 1 Location: Left arm, BP Patient Position: Sitting)    Pulse 63    Temp 98.1 °F (36.7 °C) (Oral)    Resp 18    Ht 5' 4\" (1.626 m)    Wt 139 lb 3.2 oz (63.1 kg)    SpO2 98%    BMI 23.89 kg/m2     Head: Normocephalic, without obvious abnormality, atraumatic  Eyes: conjunctivae/corneas clear. PERRL, EOM's intact. Neck: supple, symmetrical, trachea midline, no adenopathy, thyroid: not enlarged, symmetric, no tenderness/mass/nodules, no carotid bruit and no JVD  Lungs: clear to auscultation bilaterally  Heart: regular rate and rhythm, S1, S2 normal, no murmur, click, rub or gallop  Extremities: extremities atraumatic, no cyanosis. Trace edema  Pulses: 2+ and symmetric  Lymph nodes: Cervical, supraclavicular, and axillary nodes normal.  Neurologic: Grossly normal            ASSESSMENT and PLAN    ICD-10-CM ICD-9-CM    1. Uncontrolled type 2 diabetes mellitus with foot ulcer, unspecified long term insulin use status ([facility]) E11.621 250.82 HEMOGLOBIN A1C WITH EAG    L97.509 707.15 DE HANDLG&/OR CONVEY OF SPEC FOR TR OFFICE TO LAB    E11.65  DE COLLECTION VENOUS BLOOD,VENIPUNCTURE   2. Hyperlipidemia, unspecified hyperlipidemia type E78.5 272.4 LIPID PANEL      METABOLIC PANEL, COMPREHENSIVE      DE HANDLG&/OR CONVEY OF SPEC FOR TR OFFICE TO LAB      DE COLLECTION VENOUS BLOOD,VENIPUNCTURE   3. Essential hypertension, benign I10 401.1 LIPID PANEL      METABOLIC PANEL, COMPREHENSIVE      DE HANDLG&/OR CONVEY OF SPEC FOR TR OFFICE TO LAB      DE COLLECTION VENOUS BLOOD,VENIPUNCTURE   4. Osteopenia M85.80 733.90 DEXA BONE DENSITY STUDY AXIAL   5. Coronary artery disease involving native coronary artery of native heart without angina pectoris I25.10 414.01    6. Osteoarthritis of spine with radiculopathy, cervical region M47.22 721.0 oxyCODONE-acetaminophen (PERCOCET) 5-325 mg per tablet      oxyCODONE-acetaminophen (PERCOCET) 5-325 mg per tablet      oxyCODONE-acetaminophen (PERCOCET) 5-325 mg per tablet   7. Vitamin D deficiency- 11 in Nov 2015-Dr. Hu,renal E55.9 268.9 VITAMIN D, 25 HYDROXY      DEXA BONE DENSITY STUDY AXIAL   8. Disorder of bone  M89.9 733.90 DEXA BONE DENSITY STUDY AXIAL   9. Chronic systolic congestive heart failure (HCC) I50.22 428.22      428.0      Hussain Fay was seen today for diabetes, hypertension and cholesterol problem. Diagnoses and all orders for this visit:    Uncontrolled type 2 diabetes mellitus with foot ulcer, unspecified long term insulin use status (HCC)  -     HEMOGLOBIN A1C WITH EAG  -     IA HANDLG&/OR CONVEY OF SPEC FOR TR OFFICE TO LAB  -     IA COLLECTION VENOUS BLOOD,VENIPUNCTURE    Hyperlipidemia, unspecified hyperlipidemia type  -     LIPID PANEL  -     METABOLIC PANEL, COMPREHENSIVE  -     IA HANDLG&/OR CONVEY OF SPEC FOR TR OFFICE TO LAB  -     IA COLLECTION VENOUS BLOOD,VENIPUNCTURE    Essential hypertension, benign  -     LIPID PANEL  -     METABOLIC PANEL, COMPREHENSIVE  -     IA HANDLG&/OR CONVEY OF SPEC FOR TR OFFICE TO LAB  -     IA COLLECTION VENOUS BLOOD,VENIPUNCTURE    Osteopenia  -     DEXA BONE DENSITY STUDY AXIAL; Future    Coronary artery disease involving native coronary artery of native heart without angina pectoris    Osteoarthritis of spine with radiculopathy, cervical region  -     oxyCODONE-acetaminophen (PERCOCET) 5-325 mg per tablet; Take 1 Tab by mouth two (2) times daily as needed. -     oxyCODONE-acetaminophen (PERCOCET) 5-325 mg per tablet; Take 1 Tab by mouth two (2) times a day. Max Daily Amount: 2 Tabs.   - oxyCODONE-acetaminophen (PERCOCET) 5-325 mg per tablet; Take 1 Tab by mouth two (2) times a day. Max Daily Amount: 2 Tabs. Vitamin D deficiency- 6 in Nov 2015-Dr. Hu,renal  -     VITAMIN D, 25 HYDROXY  -     DEXA BONE DENSITY STUDY AXIAL; Future    Disorder of bone   -     DEXA BONE DENSITY STUDY AXIAL; Future    Chronic systolic congestive heart failure ([facility] [address]    Other orders  -     CVD REPORT  -     CKD REPORT      Follow-up Disposition:  Return in about 6 months (around 9/10/2017), or BP or glucose OOC, for F/U HTN,CHOL,DM.     lab results and schedule of future lab studies reviewed with patient  reviewed diet, exercise and weight control  cardiovascular risk and specific lipid/LDL goals reviewed  reviewed medications and side effects in detail  Please call my office if there are any questions- [phone]. I will arrange for follow up after the lab tests done from today return  Recommended a weekly \"heart check. \" I went into detail how to do this. Call for refills on medications as needed. Discussed expected course/resolution/complications of diagnosis in detail with patient. Medication risks/benefits/costs/interactions/alternatives discussed with patient. Pt was given an after visit summary which includes diagnoses, current medications & vitals. Pt expressed understanding with the diagnosis and plan. Total 45 minutes,60 % counseling re:   Reviewed the controlled substance agreement, patient signed it and has no questions. A copy was placed in the chart.  was checked and there was no suspicious behavior. Pt's vitamin D level was checked about 1.5 years ago and it was 11 but she never started Vit D at that point, so I encourage her to start 5000 units of vitamin D daily. She states she drinks a glass of milk every day for years, which is interesting, as most patients have normal levels of vitamin D with that.  We also set her up for a bone density test.     We had a long discussion about heart failure and what aggravates it, and the importance of eliminating salt from the diet. I told her that measuring her weight each morning also helps to identify fluid accumulation; I encouraged her to do that and to increase her Lasix if she notices any significant weight gain, which would be more than 3 lbs in one week. She is going to have the pharmacist call us for the glucose strips for her new glucometer. I encouraged her to increase her Neurontin to 300mg TID, and preferably to split the dose between AM and PM up to a max of 2100mg; she should stop at the dose that seems to work well for her burning feet. Reviewed BP, cholesterol and diabetic goals. LDL goal<100,HDL goal>45, Triglyceride goal<150, A1C< 7.0, BP<140/90. Reviewed symptoms, or lack thereof, of hypertension and elevated cholesterol. Reviewed in detail the proper technique of checking the blood pressure- check it on an average day only, not on a stressful day, sitting, no exercise for at least 1 hour and not experiencing any new pain( chronic pain is OK). Patient encouraged to check BP sitting and standing at least once a month and to report these readings to me if > 140/ 90 on average , or if the standing BP is >  15 points lower than the sitting. Also, discussed symptoms of concern that were noted today in the note above, treatment options( including doing nothing), when to follow up before recommended time frame. Also, answered all questions. This document was written by Trista Evans, as dictated by Robert Alex MD.  I have reviewed and agree with the above note and have made corrections where appropriate Terrell Caldwell M.D.

## 2017-03-10 NOTE — PATIENT INSTRUCTIONS

## 2017-05-04 NOTE — TELEPHONE ENCOUNTER
Contact # is [phone]    Patient is requesting a refill on \"all\" medications. When asked which ones specifically, patient reiterated \"all\" of her prescriptions. Fax prescriptions to [facility] by Mail at [phone].

## 2017-06-09 PROBLEM — Z87.39 HISTORY OF GOUT: Chronic | Status: ACTIVE | Noted: 2017-01-01

## 2017-06-09 NOTE — MR AVS SNAPSHOT
Visit Information Date & Time Provider Department Dept. Phone Encounter #  
 6/9/2017  8:45 AM Azam Conner MD [address] [place] [address] Road [phone] [id number] Your Appointments 9/11/2017  8:30 AM  
ROUTINE CARE with Azam Conner MD  
[facility]) Appt Note: 3 month med check [address] [address]  
[phone]  
  
   
 [address] [address] Upcoming Health Maintenance Date Due OSTEOPOROSIS SCREENING (DEXA) 3/27/2002 EYE EXAM RETINAL OR DILATED Q1 3/12/2016 GLAUCOMA SCREENING Q2Y 3/12/2017 FOOT EXAM Q1 4/19/2017 MEDICARE YEARLY EXAM 6/28/2017 INFLUENZA AGE 9 TO ADULT 8/1/2017 HEMOGLOBIN A1C Q6M 9/10/2017 MICROALBUMIN Q1 11/29/2017 LIPID PANEL Q1 3/10/2018 DTaP/Tdap/Td series (2 - Td) 6/27/2026 Allergies as of 6/9/2017  Review Complete On: 6/9/2017 By: Kelly Ortiz Severity Noted Reaction Type Reactions Glucophage [Metformin]  10/13/2010    Anaphylaxis Current Immunizations  Reviewed on 6/16/2013 Name Date Influenza High Dose Vaccine PF 10/26/2016, 10/25/2015 Influenza Vaccine 10/27/2016, 11/8/2014, 10/1/2013 Influenza Vaccine Split 10/16/2012 Pneumococcal Polysaccharide (PPSV-23) 8/1/2012 Pneumococcal Vaccine (Unspecified Type) 10/25/2015 Not reviewed this visit You Were Diagnosed With   
  
 Codes Comments Hyperlipidemia, unspecified hyperlipidemia type    -  Primary ICD-10-CM: E78.5 ICD-9-CM: 272.4 Vitamin D deficiency     ICD-10-CM: E55.9 ICD-9-CM: 268.9 Bilateral carotid artery disease ([facility] [address]     ICD-10-CM: I77.9 ICD-9-CM: 447.9 Essential hypertension, benign     ICD-10-CM: I10 
ICD-9-CM: 401.1 Coronary artery disease involving native coronary artery of native heart without angina pectoris     ICD-10-CM: I25.10 ICD-9-CM: 414.01   
 Chronic renal failure, stage 3 (moderate)     ICD-10-CM: N18.3 ICD-9-CM: 585.3 History of gout     ICD-10-CM: Z87.39 
ICD-9-CM: V12.29 Numbness and tingling in both hands     ICD-10-CM: R20.0, R20.2 ICD-9-CM: 782.0 Osteoarthritis of spine with radiculopathy, cervical region     ICD-10-CM: M47.22 
ICD-9-CM: 721.0 Vitals BP Pulse Temp Resp Height(growth percentile) Weight(growth percentile) 130/72 (BP 1 Location: Left arm, BP Patient Position: Sitting) 78 97.6 °F (36.4 °C) (Oral) 18 5' 4\" (1.626 m) 142 lb 12.8 oz (64.8 kg) SpO2 BMI OB Status Smoking Status 95% 24.51 kg/m2 Postmenopausal Never Smoker Vitals History BMI and BSA Data Body Mass Index Body Surface Area 24.51 kg/m 2 1.71 m 2 Preferred Pharmacy Pharmacy Name [facility] [address] [place] [address] Your Updated Medication List  
  
   
This list is accurate as of: 6/9/17  9:52 AM.  Always use your most recent med list. amLODIPine 10 mg tablet Commonly known as:  Ruslan Shield Take 1 Tab by mouth daily. aspirin 81 mg tablet Take 81 mg by mouth. atorvastatin 20 mg tablet Commonly known as:  LIPITOR  
TAKE 1 TABLET BY MOUTH AT BEDTIME Blood-Glucose Meter monitoring kit One touch ultra  
  
 colchicine 0.6 mg tablet Commonly known as:  COLCRYS  
TAKE 1 TABLET BY MOUTH EVERY DAY  
  
 folic acid 1 mg tablet Commonly known as:  Google Take 1 Tab by mouth daily. furosemide 20 mg tablet Commonly known as:  LASIX Take 1 Tab by mouth daily. gabapentin 300 mg capsule Commonly known as:  NEURONTIN Take 1 Cap by mouth daily. glucose blood VI test strips strip Commonly known as:  ASCENSIA AUTODISC VI, ONE TOUCH ULTRA TEST VI Test bid E11.9 Insulin Needles (Disposable) 31 gauge x 5/16\" Ndle Commonly known as:  BD INSULIN PEN NEEDLE UF SHORT To use with lantus daily 250.00 Insulin Syringes (Disposable) 1 mL Syrg  
20 Units by Does Not Apply route daily. isosorbide mononitrate ER 60 mg CR tablet Commonly known as:  IMDUR  
TAKE ONE TABLET BY MOUTH EVERY DAY Lancets Misc Test bid E 11.9  
  
 losartan 100 mg tablet Commonly known as:  COZAAR  
TAKE 1 TAB BY MOUTH DAILY. metoprolol succinate 50 mg XL tablet Commonly known as:  TOPROL-XL  
TAKE 1 TAB BY MOUTH DAILY. oxyCODONE-acetaminophen 5-325 mg per tablet Commonly known as:  PERCOCET Take 1 Tab by mouth two (2) times daily as needed. sAXagliptin 5 mg Tab tablet Commonly known as:  ONGLYZA TAKE 1 TABLET BY MOUTH EVERY DAY Prescriptions Printed Refills  
 oxyCODONE-acetaminophen (PERCOCET) 5-325 mg per tablet 0 Sig: Take 1 Tab by mouth two (2) times daily as needed. Class: Print Route: Oral  
  
We Performed the Following LIPID PANEL [52210 CPT(R)] METABOLIC PANEL, COMPREHENSIVE [67412 CPT(R)] VITAMIN B12 [id number] CPT(R)] VITAMIN D, 25 HYDROXY [id number] CPT(R)] Please provide this summary of care documentation to your next provider. Your primary care clinician is listed as [address] [address], [facility] . If you have any questions after today's visit, please call [phone].

## 2017-06-09 NOTE — PROGRESS NOTES
Chief Complaint   Patient presents with    Cholesterol Problem     follow up     Hypertension     follow up      1. Have you been to the ER, urgent care clinic since your last visit? Hospitalized since your last visit? No    2. Have you seen or consulted any other health care providers outside of the [facility] since your last visit? Include any pap smears or colon screening.  Yes Dr. Chaparrita Fernando- Cardiology- 5/2017

## 2017-06-09 NOTE — PROGRESS NOTES
HISTORY OF PRESENT ILLNESS  HPI  Can Downey is a [de-identified] y.o. Female with history of CAD, HTN, DM-II, CRF, hyperlipidemia, gout, and vitamin D deficiency who presents to office today for fasting medication follow-up. Pt complains of burning pain in her right upper leg down to her right ankle that is aggravated by walking; she states that the pain takes about an hour to decrease after sitting. Pt notes that the pain wakes her up at night; she wakes up in the AM with the pain, but it immediately worsens once she gets out of bed. She has not seen a specialist and has not received any injections in the area. Pt saw cardiologist Dr. Connie Koch two weeks ago. She will be having her carotid arteries checked next month; she has this done annually. She had a bilateral carotid endarterectomy in 2002, and since then she states that her CAD has not progressed. Pt notes intermittent tingling in all of her fingertips for the past 2 weeks; she notes that this occurs at different times of day. Pt denies SOB, chest pain, and any recent ER visits or hospitalizations.         Past Medical History:   Diagnosis Date    Arthritis     CAD (coronary artery disease)     Chronic renal failure, stage 3 (moderate)-Dr. Hu since 11/2015 4/25/2016    Congestive heart failure, unspecified     Diabetes ([address]     Heart failure ([address]     History of gout 6/9/2017    Hypercholesterolemia     Hypertension     [facility] infarction) [facility] 7/2013    Osteoarthritis of spine with radiculopathy, cervical region 3/10/2017    Osteopenia     DEXA 2007    Sleep apnea     Stroke [facility] Feb 26-March 6    T[facility]'s [facility] at [facility]F    Vitamin D deficiency 4/25/2016     Past Surgical History:   Procedure Laterality Date    CARDIAC CATHETERIZATION      2006 EF 50%    CARDIAC SURG PROCEDURE UNLIST  2005    cabg    HX APPENDECTOMY  1961    HX OTHER SURGICAL  7/31/2013    stent placment in patients heart and kidney    VASCULAR SURGERY PROCEDURE UNLIST Bilateral 2002    carotid enarterectomy, bilateral     Current Outpatient Prescriptions on File Prior to Visit   Medication Sig Dispense Refill    metoprolol succinate (TOPROL-XL) 50 mg XL tablet TAKE 1 TAB BY MOUTH DAILY. 90 Tab 1    losartan (COZAAR) 100 mg tablet TAKE 1 TAB BY MOUTH DAILY. 90 Tab 1    gabapentin (NEURONTIN) 300 mg capsule Take 1 Cap by mouth daily. 90 Cap 3    furosemide (LASIX) 20 mg tablet Take 1 Tab by mouth daily. 90 Tab 1    isosorbide mononitrate ER (IMDUR) 60 mg CR tablet TAKE ONE TABLET BY MOUTH EVERY DAY 90 Tab 1    Blood-Glucose Meter monitoring kit One touch ultra 1 Kit 0    glucose blood VI test strips (ASCENSIA AUTODISC VI, ONE TOUCH ULTRA TEST VI) strip Test bid E11.9 180 Strip 3    Lancets misc Test bid E 11.9 3 Package 3    amLODIPine (NORVASC) 10 mg tablet Take 1 Tab by mouth daily. 90 Tab 1    folic acid (FOLVITE) 1 mg tablet Take 1 Tab by mouth daily. 90 Tab 3    colchicine (COLCRYS) 0.6 mg tablet TAKE 1 TABLET BY MOUTH EVERY DAY 90 Tab 3    atorvastatin (LIPITOR) 20 mg tablet TAKE 1 TABLET BY MOUTH AT BEDTIME 90 Tab 1    Insulin Needles, Disposable, (BD INSULIN PEN NEEDLE UF SHORT) 31 X 5/16 \" ndle To use with lantus daily 250.00 3 Package 3    Insulin Syringes, Disposable, 1 mL syrg 20 Units by Does Not Apply route daily. 90 Syringe 3    aspirin 81 mg tablet Take 81 mg by mouth. No current facility-administered medications on file prior to visit.       Allergies   Allergen Reactions    Glucophage [Metformin] Anaphylaxis     Family History   Problem Relation Age of Onset    Diabetes Mother     Hypertension Mother     Heart Disease Maternal Grandfather     Cancer Other 64     breast cancer    Heart Disease Sister      Social History     Social History    Marital status:      Spouse name: N/A    Number of children: N/A    Years of education: N/A     Social History Main Topics    Smoking status: Never Smoker    Smokeless tobacco: Never Used  Alcohol use No    Drug use: No    Sexual activity: No     Other Topics Concern     Service No    Blood Transfusions No     unknown    Caffeine Concern Yes     2 cups of tea daily    Occupational Exposure No    Hobby Hazards No    Sleep Concern No    Stress Concern No    Weight Concern No    Special Diet Yes     no added salt, diabetic diet    Back Care No    Exercise Yes    Bike Helmet No    Seat Belt Yes    Self-Exams No     Social History Narrative               Review of Systems   Constitutional: Negative for chills, diaphoresis, fever, malaise/fatigue and weight loss. Eyes: Negative for blurred vision, double vision and pain. Respiratory: Negative for cough, shortness of breath and wheezing. Cardiovascular: Negative for chest pain, palpitations, orthopnea, claudication, leg swelling and PND. Genitourinary: Negative for frequency. Skin: Negative for itching and rash. Neurological: Positive for tingling (fingertips). Negative for dizziness, tremors, sensory change, speech change, focal weakness, seizures, loss of consciousness, weakness and headaches. Burning pain from right upper leg down to ankle   Endo/Heme/Allergies: Negative for environmental allergies and polydipsia. Does not bruise/bleed easily. Results for orders placed or performed in visit on 40/46/52   METABOLIC PANEL, COMPREHENSIVE   Result Value Ref Range    Glucose 143 (H) 65 - 99 mg/dL    BUN 47 (H) 8 - 27 mg/dL    Creatinine 1.39 (H) 0.57 - 1.00 mg/dL    GFR est non-AA 36 (L) >59 mL/min/1.73    GFR est AA 41 (L) >59 mL/min/1.73    BUN/Creatinine ratio 34 (H) 12 - 28    Sodium 139 134 - 144 mmol/L    Potassium 5.3 (H) 3.5 - 5.2 mmol/L    Chloride 101 96 - 106 mmol/L    CO2 21 18 - 29 mmol/L    Calcium 9.5 8.7 - 10.3 mg/dL    Protein, total 6.9 6.0 - 8.5 g/dL    Albumin 3.9 3.5 - 4.7 g/dL    GLOBULIN, TOTAL 3.0 1.5 - 4.5 g/dL    A-G Ratio 1.3 1.2 - 2.2    Bilirubin, total 0.4 0.0 - 1.2 mg/dL    Alk. phosphatase 98 39 - 117 IU/L    AST (SGOT) 37 0 - 40 IU/L    ALT (SGPT) 32 0 - 32 IU/L   LIPID PANEL   Result Value Ref Range    Cholesterol, total 157 100 - 199 mg/dL    Triglyceride 227 (H) 0 - 149 mg/dL    HDL Cholesterol 31 (L) >39 mg/dL    VLDL, calculated 45 (H) 5 - 40 mg/dL    LDL, calculated 81 0 - 99 mg/dL   VITAMIN D, 25 HYDROXY   Result Value Ref Range    VITAMIN D, 25-HYDROXY 22.1 (L) 30.0 - 100.0 ng/mL   VITAMIN B12   Result Value Ref Range    Vitamin B12 805 211 - 946 pg/mL   CVD REPORT   Result Value Ref Range    INTERPRETATION NTAP     PDF IMAGE Not applicable    CKD REPORT   Result Value Ref Range    Interpretation Note                Physical Exam  Visit Vitals    /72 (BP 1 Location: Left arm, BP Patient Position: Sitting)    Pulse 78    Temp 97.6 °F (36.4 °C) (Oral)    Resp 18    Ht 5' 4\" (1.626 m)    Wt 142 lb 12.8 oz (64.8 kg)    SpO2 95%    BMI 24.51 kg/m2     Head: Normocephalic, without obvious abnormality, atraumatic  Eyes: conjunctivae/corneas clear. PERRL, EOM's intact.    Neck: supple, symmetrical, trachea midline, no adenopathy, thyroid: not enlarged, symmetric, no tenderness/mass/nodules, no carotid bruit and no JVD  Lungs: clear to auscultation bilaterally  Heart: regular rate and rhythm, S1, S2 normal, no murmur, click, rub or gallop  Extremities: extremities normal, atraumatic, no cyanosis or edema  Pulses: 2+ and symmetric  Lymph nodes: Cervical, supraclavicular, and axillary nodes normal.  Neurologic: Pt notes tingling in all the fingers of her right hand except for the thumb and tingling in the second, third, and fourth fingers of the left hand, she has normal sensation otherwise in the hand to light touch and no loss of motor function of her hand  Diabetic foot exam:     Left: Reflexes 2+     Proprioception normal    Filament test normal sensation with micro filament   Pulse DP: 2+ (normal)   Pulse PT: 2+ (normal)   Deformities: None  Right: Reflexes 2+ Proprioception normal   Filament test normal sensation with micro filament   Pulse DP: 2+ (normal)   Pulse PT: 2+ (normal)   Deformities: None           ASSESSMENT and PLAN    ICD-10-CM ICD-9-CM    1. Hyperlipidemia, unspecified hyperlipidemia type X05.2 529.8 METABOLIC PANEL, COMPREHENSIVE      LIPID PANEL   2. Vitamin D deficiency- 11 in Nov 2015-Dr. Hu,renal E55.9 268.9 VITAMIN D, 25 HYDROXY   3. Bilateral carotid artery disease (HCC) I77.9 447.9    4. Essential hypertension, benign I10 401.1    5. Coronary artery disease involving native coronary artery of native heart without angina pectoris I25.10 414.01    6. Chronic renal failure, stage 3 (moderate)-Dr. Lamberto Sanchez since 11/2015 N18.3 585.3    7. History of gout Z87.39 V12.29    8. Numbness and tingling in both hands R20.0 782.0 VITAMIN B12    R20.2     9. Osteoarthritis of spine with radiculopathy, cervical region M47.22 721.0 oxyCODONE-acetaminophen (PERCOCET) 5-325 mg per tablet      DISCONTINUED: oxyCODONE-acetaminophen (PERCOCET) 5-325 mg per tablet      DISCONTINUED: oxyCODONE-acetaminophen (PERCOCET) 5-325 mg per tablet   10. Medicare annual wellness visit, subsequent Z00.00 V70.0    11. Uncontrolled type 2 diabetes mellitus with foot ulcer, unspecified long term insulin use status E11.621 250.82     L97.509 707.15     E11.65     12. Controlled diabetes mellitus with arthropathy, without long-term current use of insulin (HCC) E11.618 250.80  DIABETES FOOT EXAM     716.90      Diagnoses and all orders for this visit:    1. Hyperlipidemia, unspecified hyperlipidemia type  -     METABOLIC PANEL, COMPREHENSIVE  -     LIPID PANEL    2. Vitamin D deficiency- 11 in Nov 2015-Dr. Hu,renal  -     VITAMIN D, 25 HYDROXY    3. Bilateral carotid artery disease ([facility] [address]    4. Essential hypertension, benign    5. Coronary artery disease involving native coronary artery of native heart without angina pectoris    6.  Chronic renal failure, stage 3 (moderate)-Dr. Lamberto Sanchez since 11/2015    7. History of gout    8. Numbness and tingling in both hands  -     VITAMIN B12    9. Osteoarthritis of spine with radiculopathy, cervical region  -     oxyCODONE-acetaminophen (PERCOCET) 5-325 mg per tablet; Take 1 Tab by mouth two (2) times daily as needed. 10. Medicare annual wellness visit, subsequent    11. Uncontrolled type 2 diabetes mellitus with foot ulcer, unspecified long term insulin use status    12. Controlled diabetes mellitus with arthropathy, without long-term current use of insulin ([facility])  -      DIABETES FOOT EXAM    Other orders  -     CVD REPORT  -     CKD REPORT      Follow-up Disposition:  Return for F/U DM, f/u CAD, f/u Vitamin D deficiency. lab results and schedule of future lab studies reviewed with patient  reviewed diet, exercise and weight control  cardiovascular risk and specific lipid/LDL goals reviewed  reviewed medications and side effects in detail  Please call my office if there are any questions- [phone]. I will arrange for follow up after the lab tests done from today return  Recommended a weekly \"heart check. \" I went into detail how to do this. Call for refills on medications as needed. Discussed expected course/resolution/complications of diagnosis in detail with patient. Medication risks/benefits/costs/interactions/alternatives discussed with patient. Pt was given an after visit summary which includes diagnoses, current medications & vitals. Pt expressed understanding with the diagnosis and plan. Total 25 minutes,60 % counseling re: Reviewed in detail the proper technique of checking the blood pressure- check it on an average day only, not on a stressful day, sitting, no exercise for at least 1 hour and not experiencing any new pain( chronic pain is OK).  Patient encouraged to check BP sitting and standing at least once a month and to report these readings to me if > 140/ 90 on average , or if the standing BP is >  15 points lower than the sitting. Reviewed BP, cholesterol and diabetic goals. LDL goal<100,HDL goal>45, Triglyceride goal<150, A1C< 7.0, BP<140/90. Reviewed symptoms, or lack thereof, of hypertension and elevated cholesterol. She had an MRI of her spine back in 2012, and it did show significant spinal stenosis and some foraminal stenosis as well. I addressed that today and asked her if she wanted a referral to a specialist to consider other treatment for that, as that is what's causing her right burning leg pain. She decided to hold off on that and that she felt comfortable with her present treatment of laying down and taking Percocet. If it gets worse, she'll let us know so we can refer her for that; she'll probably need to have another MRI done at that point. Also, discussed symptoms of concern that were noted today in the note above, treatment options( including doing nothing), when to follow up before recommended time frame. Also, answered all questions. Recommended a weekly \"heart check. \" I went into detail how to do this. This document was written by Pineda Alves, as dictated by Marialuisa Guadarrama MD.  I have reviewed and agree with the above note and have made corrections where appropriate Terrell Reynolds M.D.

## 2017-06-09 NOTE — LETTER
6/26/2017 10:10 AM 
 
Ms. Annette Galeano 
[address] [phone] Dear Annette Galeano: 
 
Please find your most recent results below. Resulted Orders METABOLIC PANEL, COMPREHENSIVE Result Value Ref Range Glucose 143 (H) 65 - 99 mg/dL BUN 47 (H) 8 - 27 mg/dL Creatinine 1.39 (H) 0.57 - 1.00 mg/dL GFR est non-AA 36 (L) >59 mL/min/1.73 GFR est AA 41 (L) >59 mL/min/1.73  
 BUN/Creatinine ratio 34 (H) 12 - 28 Sodium 139 134 - 144 mmol/L Potassium 5.3 (H) 3.5 - 5.2 mmol/L Chloride 101 96 - 106 mmol/L  
 CO2 21 18 - 29 mmol/L Calcium 9.5 8.7 - 10.3 mg/dL Protein, total 6.9 6.0 - 8.5 g/dL Albumin 3.9 3.5 - 4.7 g/dL GLOBULIN, TOTAL 3.0 1.5 - 4.5 g/dL A-G Ratio 1.3 1.2 - 2.2 Bilirubin, total 0.4 0.0 - 1.2 mg/dL Alk. phosphatase 98 39 - 117 IU/L  
 AST (SGOT) 37 0 - 40 IU/L  
 ALT (SGPT) 32 0 - 32 IU/L Narrative Performed at:  [address]  [id number] : Lynette Saini MD, Phone:  [phone] LIPID PANEL Result Value Ref Range Cholesterol, total 157 100 - 199 mg/dL Triglyceride 227 (H) 0 - 149 mg/dL HDL Cholesterol 31 (L) >39 mg/dL VLDL, calculated 45 (H) 5 - 40 mg/dL LDL, calculated 81 0 - 99 mg/dL Narrative Performed at:  [address]  [id number] : Lynette Saini MD, Phone:  [phone] VITAMIN D, 25 HYDROXY Result Value Ref Range VITAMIN D, 25-HYDROXY 22.1 (L) 30.0 - 100.0 ng/mL Comment:  
   Vitamin D deficiency has been defined by the [facility] practice guideline as a 
level of serum 25-OH vitamin D less than 20 ng/mL (1,2). The Endocrine Society went on to further define vitamin D 
insufficiency as a level between 21 and 29 ng/mL (2). 1. IOM ([place] [employer]). 2010. Dietary reference 
   intakes for calcium and D. [facility]:  The 
 [employer]. 2. Maria E MF, Chencho GUNN, Bella SUMMERS, et al. 
   Evaluation, treatment, and prevention of vitamin D 
   deficiency: an Endocrine Society clinical practice 
   guideline. JCEM. 2011 Jul; 96(7):1911-30. Narrative Performed at:  [address]  [id number] : Roopa Fabian MD, Phone:  [phone] VITAMIN B12 Result Value Ref Range Vitamin B12 805 211 - 946 pg/mL Narrative Performed at:  [address]  [id number] : Roopa Fabian MD, Phone:  [phone] CVD REPORT Result Value Ref Range INTERPRETATION NTAP   
 PDF IMAGE Not applicable Narrative Performed at:  [facility] [address] [place] [address]  [id number] : Hunter Hernandez PhD, Phone:  [phone] CKD REPORT Result Value Ref Range Interpretation Note Comment:  
   Supplement report is available. Narrative Performed at:  [address] [place] [address] A [address] [place] [address]  [id number] : Hunter Hernandez PhD, Phone:  [phone] RECOMMENDATIONS: 
Vit D is low, B 12 is normal. Start 2000 units of Vit D daily available OTC to prevent broken bones. Everything else is stable. Recheck fasting office visit every 3 months to monitor the renal function, the glucose, liver function tests ,etc.  
 
Please call me if you have any questions: [phone] Sincerely, 
 
 
Klever Camp MD

## 2017-06-26 NOTE — PROGRESS NOTES
Vit D is low, B 12 is normal. Start 2000 units of Vit D daily available OTC to prevent broken bones. Everything else is stable.  Recheck fasting office visit every 3 months to monitor the renal function, the glucose, liver function tests ,etc.

## 2017-07-06 NOTE — TELEPHONE ENCOUNTER
Pharmacy on file verified  *Patient is requesting a gap medication be sent to the [facility] on file as she will be out before her medication is delivered by mail.

## 2017-07-24 NOTE — PROGRESS NOTES
NNTOCED     Pt [facility] on [facility] daily report) Discharge list. Seen @ [facility] ED.     Diagnosis:      Discharge Plan/Instructions:      10:20 AM - pt outreach (call). Pt ID verified with 2 identifiers, name and . NN introduction. Reason for call stated. - Pt reported \"I'm doing well. \"  Pt reports that she is having relief from the Rx given in the ED. Performed  medication reconciliation with patient, and patient verbalizes understanding of administration of home medications. Albuterol Sulfate HFA(inhaler) 108 (90 Base) MCG/ACT # 1 Inhaler 1-2  PUFFS EVERY 4-6 HOURS AS NEEDED FOR DYSPNEA/WHEEZING, Azithromycin 250 Milligram # 6 Tablets 2 TABLETS TODAY, THEN 1 TABLET  DAILY THERAFTER, Tylenol with Codeine #3 300-30 Milligram # 15 Tablets 1-2 TABLETS EVERY  4 TO 6 HOURS AS NEEDED FOR PAIN FOR COUGH prescribed by Dr. Renee Aden at [facility] ED have been added to the medication list. Reviewed discharge instructions with patient. Patient verbalizes understand of discharge instructions and follow-up care. Future Appointments  Date Time Provider Melchor Sanabria   2017 8:30 AM Jass Walker  [facility] Sanford   Reviewed red flags with patient, and patient verbalizes understanding. No other clinical/social/functional needs noted. Patient given an opportunity to ask questions. Contact information provided to the patient for future reference or further questions. Red Flags:  - Fever over 100.5 for more than three days.   - Trouble breathing worsens, wheezing or pain with breathing.   - Coughing up blood or increased amounts of colored sputum.   - Weakness, drowsiness, headache, facial pain, ear pain, or a stiff  neck.

## 2017-08-24 PROBLEM — E11.618: Status: ACTIVE | Noted: 2017-01-01

## 2017-08-24 NOTE — PROGRESS NOTES
Doris Thomas is a [de-identified] y.o. female and presents for annual Medicare Wellness Visit. Problem List: Reviewed with patient and discussed risk factors.     Patient Active Problem List   Diagnosis Code    CAD (coronary artery disease)-[id number].Katz and CAM Zheng;NSTEMI 6/2013 I25.10    Hyperlipemia E78.5    Essential hypertension, benign I10    DM (diabetes mellitus), type 2, uncontrolled ([facility] [address] E11.65    Congestive heart failure (HCC) I50.9    Osteopenia M85.80    Gout M10.9    LIAM (acute kidney injury) ([facility] [address] N17.9    Carotid artery disease- see June 2013 doppler I77.9    H/O renal artery stenosis- left only;- stent 7/31/2013 Z86.79    Chronic systolic congestive heart failure (HCC) I50.22    Vitamin D deficiency- 11 in Nov 2015-Dr. Hu,renal E55.9    Chronic renal failure, stage 3 (moderate)-Dr. Sylvie Perry since 11/2015 N18.3    History of acute gouty arthritis Z87.39    ACP (advance care planning) Z71.89    Osteoarthritis of spine with radiculopathy, cervical region M47.22    History of gout Z87.39    Controlled diabetes mellitus with arthropathy, without long-term current use of insulin ([facility]) E11.618       Current medical providers:  Patient Care Team:  Ben Carcamo MD as PCP - General (Family Practice)    PSH: Reviewed with patient  Past Surgical History:   Procedure Laterality Date    CARDIAC CATHETERIZATION      2006 EF 50%    CARDIAC SURG PROCEDURE UNLIST  2005    cabg    HX APPENDECTOMY  1961    HX OTHER SURGICAL  7/31/2013    stent placment in patients heart and kidney    VASCULAR SURGERY PROCEDURE UNLIST Bilateral 2002    carotid enarterectomy, bilateral        : Reviewed with patient  Social History   Substance Use Topics    Smoking status: Never Smoker    Smokeless tobacco: Never Used    Alcohol use No       FH: Reviewed with patient  Family History   Problem Relation Age of Onset    Diabetes Mother     Hypertension Mother     Heart Disease Maternal Grandfather     Cancer Other 62     breast cancer    Heart Disease Sister        Medications/Allergies: Reviewed with patient  Current Outpatient Prescriptions on File Prior to Visit   Medication Sig Dispense Refill    metoprolol succinate (TOPROL-XL) 50 mg XL tablet TAKE 1 TAB BY MOUTH DAILY. 90 Tab 1    losartan (COZAAR) 100 mg tablet TAKE 1 TAB BY MOUTH DAILY. 90 Tab 1    gabapentin (NEURONTIN) 300 mg capsule Take 1 Cap by mouth daily. 90 Cap 3    furosemide (LASIX) 20 mg tablet Take 1 Tab by mouth daily. 90 Tab 1    isosorbide mononitrate ER (IMDUR) 60 mg CR tablet TAKE ONE TABLET BY MOUTH EVERY DAY 90 Tab 1    Blood-Glucose Meter monitoring kit One touch ultra 1 Kit 0    glucose blood VI test strips (ASCENSIA AUTODISC VI, ONE TOUCH ULTRA TEST VI) strip Test bid E11.9 180 Strip 3    Lancets misc Test bid E 11.9 3 Package 3    amLODIPine (NORVASC) 10 mg tablet Take 1 Tab by mouth daily. 90 Tab 1    folic acid (FOLVITE) 1 mg tablet Take 1 Tab by mouth daily. 90 Tab 3    colchicine (COLCRYS) 0.6 mg tablet TAKE 1 TABLET BY MOUTH EVERY DAY 90 Tab 3    atorvastatin (LIPITOR) 20 mg tablet TAKE 1 TABLET BY MOUTH AT BEDTIME 90 Tab 1    Insulin Needles, Disposable, (BD INSULIN PEN NEEDLE UF SHORT) 31 X 5/16 \" ndle To use with lantus daily 250.00 3 Package 3    Insulin Syringes, Disposable, 1 mL syrg 20 Units by Does Not Apply route daily. 90 Syringe 3    aspirin 81 mg tablet Take 81 mg by mouth. No current facility-administered medications on file prior to visit. Allergies   Allergen Reactions    Glucophage [Metformin] Anaphylaxis       Objective:  Visit Vitals    /72 (BP 1 Location: Left arm, BP Patient Position: Sitting)    Pulse 78    Temp 97.6 °F (36.4 °C) (Oral)    Resp 18    Ht 5' 4\" (1.626 m)    Wt 142 lb 12.8 oz (64.8 kg)    SpO2 95%    BMI 24.51 kg/m2    Body mass index is 24.51 kg/(m^2).     Assessment of cognitive impairment: Alert and oriented x 3    Depression Screen:   PHQ over the last two weeks 3/10/2017   Little interest or pleasure in doing things Not at all   Feeling down, depressed or hopeless Not at all   Total Score PHQ 2 0       Fall Risk Assessment:    Fall Risk Assessment, last 12 mths 3/10/2017   Able to walk? Yes   Fall in past 12 months? No       Functional Ability:   Does the patient exhibit a steady gait? yes   How long did it take the patient to get up and walk from a sitting position? 6-7 seconds   Is the patient self reliant?  (ie can do own laundry, meals, household chores)  yes     Does the patient handle his/her own medications? yes     Does the patient handle his/her own money? yes     Is the patients home safe (ie good lighting, handrails on stairs and bath, etc.)? yes     Did you notice or did patient express any hearing difficulties? no     Did you notice or did patient express any vision difficulties?   no     Were distance and reading eye charts used? Yes. Patient's  full eye exam by an ophthalmologist every 2 years is unremarkable: no glaucoma or macular degeneration. Advance Care Planning:   Patient was offered the opportunity to discuss advance care planning:  yes     Does patient have an Advance Directive:  No;I reviewed advanced directive information and written information given to patient; patient to make follow up appointment with a NN to review choices for health care, agent, and anatomical gifts. If no, did you provide information on Caring Connections? yes       Plan:    Advance Care Planning (ACP) Provider Note - Comprehensive     Date of ACP Conversation: 08/24/17  Persons included in Conversation:  patient  Length of ACP Conversation in minutes:  <16 minutes (Non-Billable)    Authorized Decision Maker (if patient is incapable of making informed decisions):    This person is:  Healthcare Agent/Medical Power of  under Advance Directive          General ACP for ALL Patients with Decision Making Capacity:   Importance of advance care planning, including choosing a healthcare agent to communicate patient's healthcare decisions if patient lost the ability to make decisions, such as after a sudden illness or accident  Understanding of the healthcare agent role was assessed and information provided    Review of Existing Advance Directive: For Serious or Chronic Illness:  No known illness    Interventions Provided:  Recommended completion of Advance Directive form after review of ACP materials and conversation with prospective healthcare agent   Recommended communicating the plan and making copies for the healthcare agent, personal physician, and others as appropriate (e.g., health system)  Recommended review of completed ACP document annually or upon change in health status    Orders Placed This Encounter    METABOLIC PANEL, COMPREHENSIVE    LIPID PANEL    VITAMIN D, 25 HYDROXY    VITAMIN B12    CVD REPORT    CKD REPORT    HM DIABETES FOOT EXAM    DISCONTD: oxyCODONE-acetaminophen (PERCOCET) 5-325 mg per tablet    DISCONTD: oxyCODONE-acetaminophen (PERCOCET) 5-325 mg per tablet    oxyCODONE-acetaminophen (PERCOCET) 5-325 mg per tablet       Health Maintenance   Topic Date Due    OSTEOPOROSIS SCREENING (DEXA)  03/27/2002    GLAUCOMA SCREENING Q2Y  03/12/2017    MEDICARE YEARLY EXAM  06/28/2017    INFLUENZA AGE 9 TO ADULT  08/01/2017    EYE EXAM RETINAL OR DILATED Q1  09/27/2017 (Originally 3/12/2016)    MICROALBUMIN Q1  11/29/2017    HEMOGLOBIN A1C Q6M  12/09/2017    FOOT EXAM Q1  06/09/2018    LIPID PANEL Q1  06/09/2018    DTaP/Tdap/Td series (2 - Td) 06/27/2026    ZOSTER VACCINE AGE 60>  Addressed    Pneumococcal 65+ High/Highest Risk  Completed       *Patient verbalized understanding and agreement with the plan. A copy of the After Visit Summary with personalized health plan was given to the patient today.

## 2017-10-10 NOTE — PROGRESS NOTES
Patient was admitted to [facility] 9/20 and discharged to SNF on 10/9. Transferred to [facility]. LM for CM Ebenezer Lake(ph [phone]) to call me back with a discharge date. Patient had been admitted to [facility] for fever and sob. Found to have- Acute on chronic hypoxic respiratory failure d/t HCAP, UTI, severe sepsis,copd,CHF,HTN,DM2,HTN,renal artery stenosis,gout,constipation. Called daughter, Sharri Sams, mother lives with her. Wants to get a hospital bed for her for when she comes home-suggested she discuss with CM at [facility] to get an order and authorization for it. Requested that she let me know when patient has a discharge date so that I can check with her once home to make sure she has everything she needs as well as a f/u with . Daughter agreed to do so and thanked me for the call. Probems   #1- Respiratory failure,hypoxic. Was on a ventilator, eventually weaned off. Not using any oxygen now-room air only. Respiratory status stable on discharge. #2-Sepsis-LA elevated, CXR showed \" bibasal streaky parenchymal change\". Blood culture no growth on final readings. Completed IV Levaquin and improved. #3-UTI-urine culture grew Klebsiella pneumoniae > 100,000 colonies/ml. Treated with IV Meropenem. WBC now normal and afebrile. #4- possible aspiration rather than pneumonia. Diet-diabetic Dysphagia 3 diet.  mbt

## 2017-10-19 NOTE — PROGRESS NOTES
Patient on Daily Census-seen at [facility] ED on 10/17 for c/o bilateral leg swelling and weight gain. Has h/o diastolic CHF. ED did CXR(stable and lungs clear),labs(Trop neg),renal function stable, 99% sat, HR-70's. Advised to double Bumex and f/u with cardio,  this week. Called patient today, spoke to daughter, she is still at [facility]. Has been there since her discharge from [facility] on 10/9 [facility] adm 9/20-10/9  DX-UTI,acute on chronic hypoxic Resp failure due to HCAP, severe sepsis,copd,chronic CHF.)   Daughter has not been told a discharge date from [address] [place] [address] will call SW there today to inquire. Says her feet and legs still swollen, needs to elevate. Has an appt to see cardio on 10/31-Dr.Shelton Elsa Hernandez and 11/9-. Requested she call me when she has a discharge date. Gave her my contact information.

## 2017-12-08 NOTE — PROGRESS NOTES
Patient admitted to [facility] 11/7 and discharged to rehab, [facility], on 12/7/17. NN called daughter, Cecilia Winchester- gave 2 identifiers. Reports mother lives with her. Has been in and out of [facility] since August. Had developed a \"breathing problem\" and went to [facility] 11/7. Was doing better and then had a heart attack. Complications, developed kidney failure from the \"dye\" and had to start dialysis. Kidney function improved, per daughter, and was able to stop the dialysis. Under care of  at [facility]. Requested that daughter give us a call when gets a discharge date for her mother so that we can make sure all f/u appointments are scheduled, that she has all of her DME, and more. Daughter agreed to do so-provided her with NN contact information.

## 2017-12-15 NOTE — PROGRESS NOTES
Patient at [facility] EM[facility] 11/7-12/7 then to rehab at [facility]. Called Chuy Shah today and  for [address] [place] [address] to call when she has a discharge date for the patient.

## 2017-12-21 NOTE — PROGRESS NOTES
[address] [facility], spoke to Discharge planner, [facility], does not have a discharge date for her at this time, requested that she give me a call when it is set. Contact information provided.

## 2018-01-01 ENCOUNTER — DOCUMENTATION ONLY (OUTPATIENT)
Dept: FAMILY MEDICINE CLINIC | Age: 83
End: 2018-01-01

## 2018-01-03 NOTE — PROGRESS NOTES
NN had spoken to CM at [facility] on 12/21-did not have a discharge date at that time for the patient, per S[address]. 12/28-NN saw that patient had been admitted to [facility]. Today, review of her records at [facility], still In patient there, condition has deteriorated and now has an order for [address]-Hospice. Family has chosen a [facility] and will probably be going back to [facility] today for continued care as does not qualify for In Patient Hospice care at this time. NN will not follow patient since she is now under care of Hospice.